# Patient Record
Sex: FEMALE | Race: WHITE
[De-identification: names, ages, dates, MRNs, and addresses within clinical notes are randomized per-mention and may not be internally consistent; named-entity substitution may affect disease eponyms.]

---

## 2024-04-05 ENCOUNTER — HOSPITAL ENCOUNTER (EMERGENCY)
Age: 19
Discharge: HOME | End: 2024-04-05
Payer: COMMERCIAL

## 2024-04-05 VITALS
DIASTOLIC BLOOD PRESSURE: 94 MMHG | OXYGEN SATURATION: 98 % | HEART RATE: 80 BPM | SYSTOLIC BLOOD PRESSURE: 138 MMHG | TEMPERATURE: 97.88 F

## 2024-04-05 VITALS — BODY MASS INDEX: 39.7 KG/M2

## 2024-04-05 DIAGNOSIS — S09.8XXA: Primary | ICD-10-CM
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
## 2024-04-05 DIAGNOSIS — Z79.899: ICD-10-CM

## 2024-04-05 DIAGNOSIS — Z97.5: ICD-10-CM

## 2024-04-05 PROCEDURE — 99282 EMERGENCY DEPT VISIT SF MDM: CPT

## 2024-08-14 ENCOUNTER — HOSPITAL ENCOUNTER (INPATIENT)
Age: 19
LOS: 1 days | Discharge: ANOTHER HOSPITAL | End: 2024-08-15
Attending: EMERGENCY MEDICINE | Admitting: PSYCHIATRY & NEUROLOGY
Payer: COMMERCIAL

## 2024-08-14 DIAGNOSIS — T39.1X2A INTENTIONAL ACETAMINOPHEN OVERDOSE, INITIAL ENCOUNTER (HCC): Primary | ICD-10-CM

## 2024-08-14 DIAGNOSIS — T50.902A INTENTIONAL OVERDOSE, INITIAL ENCOUNTER (HCC): ICD-10-CM

## 2024-08-14 DIAGNOSIS — T14.91XA SUICIDE ATTEMPT (HCC): ICD-10-CM

## 2024-08-14 LAB
ALBUMIN SERPL-MCNC: 4.3 G/DL (ref 3.5–5.2)
ALP SERPL-CCNC: 44 U/L (ref 35–104)
ALT SERPL-CCNC: 14 U/L (ref 5–33)
AMPHET UR QL SCN: NEGATIVE
ANION GAP SERPL CALCULATED.3IONS-SCNC: 12 MMOL/L (ref 9–17)
APAP SERPL-MCNC: 106 UG/ML (ref 10–30)
APAP SERPL-MCNC: 53 UG/ML (ref 10–30)
AST SERPL-CCNC: 13 U/L
BARBITURATES UR QL SCN: NEGATIVE
BASOPHILS # BLD: 0 K/UL (ref 0–0.2)
BASOPHILS NFR BLD: 1 % (ref 0–2)
BENZODIAZ UR QL: NEGATIVE
BILIRUB SERPL-MCNC: 0.3 MG/DL (ref 0.3–1.2)
BUN SERPL-MCNC: 12 MG/DL (ref 6–20)
CALCIUM SERPL-MCNC: 9 MG/DL (ref 8.6–10.4)
CANNABINOIDS UR QL SCN: POSITIVE
CHLORIDE SERPL-SCNC: 107 MMOL/L (ref 98–107)
CO2 SERPL-SCNC: 21 MMOL/L (ref 20–31)
COCAINE UR QL SCN: NEGATIVE
CREAT SERPL-MCNC: 0.7 MG/DL (ref 0.5–0.9)
EOSINOPHIL # BLD: 0.1 K/UL (ref 0–0.4)
EOSINOPHILS RELATIVE PERCENT: 1 % (ref 0–4)
ERYTHROCYTE [DISTWIDTH] IN BLOOD BY AUTOMATED COUNT: 12.4 % (ref 11.5–14.9)
ETHANOL PERCENT: <0.01 %
ETHANOLAMINE SERPL-MCNC: <10 MG/DL (ref 0–0.08)
FENTANYL UR QL: NEGATIVE
GFR, ESTIMATED: >90 ML/MIN/1.73M2
GLUCOSE SERPL-MCNC: 104 MG/DL (ref 70–99)
HCG UR QL: NEGATIVE
HCT VFR BLD AUTO: 38.9 % (ref 36–46)
HGB BLD-MCNC: 13.3 G/DL (ref 12–16)
INR PPP: 1.1
LYMPHOCYTES NFR BLD: 1.2 K/UL (ref 1.2–5.2)
LYMPHOCYTES RELATIVE PERCENT: 14 % (ref 25–45)
MAGNESIUM SERPL-MCNC: 2 MG/DL (ref 1.6–2.6)
MCH RBC QN AUTO: 30.8 PG (ref 26–34)
MCHC RBC AUTO-ENTMCNC: 34.2 G/DL (ref 31–37)
MCV RBC AUTO: 89.9 FL (ref 80–100)
METHADONE UR QL: NEGATIVE
MONOCYTES NFR BLD: 0.5 K/UL (ref 0.1–1.3)
MONOCYTES NFR BLD: 6 % (ref 2–8)
NEUTROPHILS NFR BLD: 78 % (ref 34–64)
NEUTS SEG NFR BLD: 6.9 K/UL (ref 1.3–9.1)
OPIATES UR QL SCN: NEGATIVE
OXYCODONE UR QL SCN: NEGATIVE
PARTIAL THROMBOPLASTIN TIME: 25.9 SEC (ref 24–36)
PCP UR QL SCN: NEGATIVE
PLATELET # BLD AUTO: 218 K/UL (ref 150–450)
PMV BLD AUTO: 8.7 FL (ref 6–12)
POTASSIUM SERPL-SCNC: 3.9 MMOL/L (ref 3.7–5.3)
PROT SERPL-MCNC: 7 G/DL (ref 6.4–8.3)
PROTHROMBIN TIME: 14.7 SEC (ref 11.8–14.6)
RBC # BLD AUTO: 4.33 M/UL (ref 4–5.2)
SALICYLATES SERPL-MCNC: <1 MG/DL (ref 3–10)
SODIUM SERPL-SCNC: 140 MMOL/L (ref 135–144)
TEST INFORMATION: ABNORMAL
WBC OTHER # BLD: 8.6 K/UL (ref 4.5–13.5)

## 2024-08-14 PROCEDURE — 2000000000 HC ICU R&B

## 2024-08-14 PROCEDURE — 80143 DRUG ASSAY ACETAMINOPHEN: CPT

## 2024-08-14 PROCEDURE — 93005 ELECTROCARDIOGRAM TRACING: CPT | Performed by: EMERGENCY MEDICINE

## 2024-08-14 PROCEDURE — 85730 THROMBOPLASTIN TIME PARTIAL: CPT

## 2024-08-14 PROCEDURE — 80179 DRUG ASSAY SALICYLATE: CPT

## 2024-08-14 PROCEDURE — 81025 URINE PREGNANCY TEST: CPT

## 2024-08-14 PROCEDURE — 99285 EMERGENCY DEPT VISIT HI MDM: CPT

## 2024-08-14 PROCEDURE — 6360000002 HC RX W HCPCS: Performed by: EMERGENCY MEDICINE

## 2024-08-14 PROCEDURE — 80307 DRUG TEST PRSMV CHEM ANLYZR: CPT

## 2024-08-14 PROCEDURE — 85025 COMPLETE CBC W/AUTO DIFF WBC: CPT

## 2024-08-14 PROCEDURE — 96375 TX/PRO/DX INJ NEW DRUG ADDON: CPT

## 2024-08-14 PROCEDURE — 36415 COLL VENOUS BLD VENIPUNCTURE: CPT

## 2024-08-14 PROCEDURE — G0480 DRUG TEST DEF 1-7 CLASSES: HCPCS

## 2024-08-14 PROCEDURE — 85610 PROTHROMBIN TIME: CPT

## 2024-08-14 PROCEDURE — 80053 COMPREHEN METABOLIC PANEL: CPT

## 2024-08-14 PROCEDURE — 6360000002 HC RX W HCPCS: Performed by: INTERNAL MEDICINE

## 2024-08-14 PROCEDURE — 96374 THER/PROPH/DIAG INJ IV PUSH: CPT

## 2024-08-14 PROCEDURE — 83735 ASSAY OF MAGNESIUM: CPT

## 2024-08-14 PROCEDURE — 2580000003 HC RX 258: Performed by: EMERGENCY MEDICINE

## 2024-08-14 RX ORDER — SODIUM CHLORIDE 0.9 % (FLUSH) 0.9 %
10 SYRINGE (ML) INJECTION PRN
Status: DISCONTINUED | OUTPATIENT
Start: 2024-08-14 | End: 2024-08-15 | Stop reason: HOSPADM

## 2024-08-14 RX ORDER — ENOXAPARIN SODIUM 100 MG/ML
30 INJECTION SUBCUTANEOUS 2 TIMES DAILY
Status: DISCONTINUED | OUTPATIENT
Start: 2024-08-14 | End: 2024-08-15 | Stop reason: HOSPADM

## 2024-08-14 RX ORDER — BUSPIRONE HYDROCHLORIDE 5 MG/1
5 TABLET ORAL 2 TIMES DAILY
Status: ON HOLD | COMMUNITY
Start: 2023-12-05 | End: 2024-08-18 | Stop reason: HOSPADM

## 2024-08-14 RX ORDER — ONDANSETRON 4 MG/1
4 TABLET, ORALLY DISINTEGRATING ORAL EVERY 8 HOURS PRN
Status: DISCONTINUED | OUTPATIENT
Start: 2024-08-14 | End: 2024-08-15 | Stop reason: HOSPADM

## 2024-08-14 RX ORDER — ACETAMINOPHEN 325 MG/1
650 TABLET ORAL EVERY 6 HOURS PRN
Status: DISCONTINUED | OUTPATIENT
Start: 2024-08-14 | End: 2024-08-15 | Stop reason: HOSPADM

## 2024-08-14 RX ORDER — SODIUM CHLORIDE 0.9 % (FLUSH) 0.9 %
5-40 SYRINGE (ML) INJECTION EVERY 12 HOURS SCHEDULED
Status: DISCONTINUED | OUTPATIENT
Start: 2024-08-14 | End: 2024-08-15 | Stop reason: HOSPADM

## 2024-08-14 RX ORDER — SODIUM CHLORIDE 9 MG/ML
INJECTION, SOLUTION INTRAVENOUS PRN
Status: DISCONTINUED | OUTPATIENT
Start: 2024-08-14 | End: 2024-08-15 | Stop reason: HOSPADM

## 2024-08-14 RX ORDER — NYSTATIN 10B UNIT
1 POWDER (EA) MISCELLANEOUS 2 TIMES DAILY
Status: ON HOLD | COMMUNITY
End: 2024-08-18 | Stop reason: HOSPADM

## 2024-08-14 RX ORDER — 0.9 % SODIUM CHLORIDE 0.9 %
1000 INTRAVENOUS SOLUTION INTRAVENOUS ONCE
Status: COMPLETED | OUTPATIENT
Start: 2024-08-14 | End: 2024-08-14

## 2024-08-14 RX ORDER — ACETAMINOPHEN 650 MG/1
650 SUPPOSITORY RECTAL EVERY 6 HOURS PRN
Status: DISCONTINUED | OUTPATIENT
Start: 2024-08-14 | End: 2024-08-15 | Stop reason: HOSPADM

## 2024-08-14 RX ORDER — POTASSIUM CHLORIDE 7.45 MG/ML
10 INJECTION INTRAVENOUS PRN
Status: DISCONTINUED | OUTPATIENT
Start: 2024-08-14 | End: 2024-08-15 | Stop reason: HOSPADM

## 2024-08-14 RX ORDER — MAGNESIUM SULFATE 1 G/100ML
1000 INJECTION INTRAVENOUS PRN
Status: DISCONTINUED | OUTPATIENT
Start: 2024-08-14 | End: 2024-08-15 | Stop reason: HOSPADM

## 2024-08-14 RX ORDER — LORAZEPAM 2 MG/ML
1 INJECTION INTRAMUSCULAR ONCE
Status: COMPLETED | OUTPATIENT
Start: 2024-08-14 | End: 2024-08-14

## 2024-08-14 RX ORDER — ONDANSETRON 2 MG/ML
4 INJECTION INTRAMUSCULAR; INTRAVENOUS ONCE
Status: COMPLETED | OUTPATIENT
Start: 2024-08-14 | End: 2024-08-14

## 2024-08-14 RX ORDER — POTASSIUM CHLORIDE 20 MEQ/1
40 TABLET, EXTENDED RELEASE ORAL PRN
Status: DISCONTINUED | OUTPATIENT
Start: 2024-08-14 | End: 2024-08-15 | Stop reason: HOSPADM

## 2024-08-14 RX ORDER — ARIPIPRAZOLE 5 MG/1
5 TABLET ORAL EVERY MORNING
Status: ON HOLD | COMMUNITY
Start: 2024-02-04 | End: 2024-08-18 | Stop reason: HOSPADM

## 2024-08-14 RX ORDER — ONDANSETRON 2 MG/ML
4 INJECTION INTRAMUSCULAR; INTRAVENOUS EVERY 6 HOURS PRN
Status: DISCONTINUED | OUTPATIENT
Start: 2024-08-14 | End: 2024-08-15 | Stop reason: HOSPADM

## 2024-08-14 RX ORDER — ONDANSETRON 2 MG/ML
4 INJECTION INTRAMUSCULAR; INTRAVENOUS ONCE
Status: DISCONTINUED | OUTPATIENT
Start: 2024-08-14 | End: 2024-08-15

## 2024-08-14 RX ORDER — POLYETHYLENE GLYCOL 3350 17 G/17G
17 POWDER, FOR SOLUTION ORAL DAILY PRN
Status: DISCONTINUED | OUTPATIENT
Start: 2024-08-14 | End: 2024-08-15 | Stop reason: HOSPADM

## 2024-08-14 RX ORDER — TOBRAMYCIN AND DEXAMETHASONE 3; 1 MG/ML; MG/ML
1 SUSPENSION/ DROPS OPHTHALMIC
COMMUNITY
Start: 2024-08-12

## 2024-08-14 RX ADMIN — ENOXAPARIN SODIUM 30 MG: 100 INJECTION SUBCUTANEOUS at 22:48

## 2024-08-14 RX ADMIN — ACETYLCYSTEINE 15000 MG: 200 INJECTION, SOLUTION INTRAVENOUS at 18:21

## 2024-08-14 RX ADMIN — SODIUM CHLORIDE 1000 ML: 9 INJECTION, SOLUTION INTRAVENOUS at 18:12

## 2024-08-14 RX ADMIN — ACETYLCYSTEINE 5000 MG: 200 INJECTION, SOLUTION INTRAVENOUS at 19:32

## 2024-08-14 RX ADMIN — ONDANSETRON 4 MG: 2 INJECTION INTRAMUSCULAR; INTRAVENOUS at 18:03

## 2024-08-14 RX ADMIN — ONDANSETRON 4 MG: 2 INJECTION INTRAMUSCULAR; INTRAVENOUS at 20:37

## 2024-08-14 RX ADMIN — LORAZEPAM 1 MG: 2 INJECTION INTRAMUSCULAR; INTRAVENOUS at 18:03

## 2024-08-14 ASSESSMENT — LIFESTYLE VARIABLES
HOW MANY STANDARD DRINKS CONTAINING ALCOHOL DO YOU HAVE ON A TYPICAL DAY: PATIENT DOES NOT DRINK
HOW OFTEN DO YOU HAVE A DRINK CONTAINING ALCOHOL: NEVER

## 2024-08-14 ASSESSMENT — ENCOUNTER SYMPTOMS
ABDOMINAL PAIN: 0
COUGH: 0
VOMITING: 1
NAUSEA: 1
SHORTNESS OF BREATH: 0

## 2024-08-14 NOTE — ED PROVIDER NOTES
EMERGENCY DEPARTMENT ENCOUNTER    Pt Name: Sharri Warner  MRN: [id number]  Birthdate 2005  Date of evaluation: 8/14/24  CHIEF COMPLAINT       Chief Complaint   Patient presents with    Ingestion    Suicide Attempt     HISTORY OF PRESENT ILLNESS   Presenting after an intentional overdose.  Patient said that she has been suffering with depression went through a long-term relationship breakup recently.  Patient feels like her life plans are all not amounting to anything and she felt like she did not know what to do with her life.  Patient said that she did something \"stupid \"and took about 10-15 Abilify 5 mg and 10-15 buspirone 5 mg tablets.  Patient also took an unknown amount of Tylenol.  She said that she filled one of the prescriptions with Tylenol and then tried to ingest all of it.  There is concern that she may have ingested 50+ tablets of Tylenol.  Patient said that she had some nausea and vomiting en route.  She is complaining of lightheadedness.    The history is provided by the patient.           REVIEW OF SYSTEMS     Review of Systems   Constitutional:  Negative for chills and fever.   HENT:  Negative for congestion.    Eyes:  Negative for visual disturbance.   Respiratory:  Negative for cough and shortness of breath.    Cardiovascular:  Negative for chest pain.   Gastrointestinal:  Positive for nausea and vomiting. Negative for abdominal pain.   Genitourinary:  Negative for dysuria, flank pain, frequency and urgency.   Musculoskeletal:  Negative for myalgias.   Neurological:  Positive for dizziness and light-headedness. Negative for syncope and headaches.   Psychiatric/Behavioral:  Positive for dysphoric mood and suicidal ideas.      PASTMEDICAL HISTORY   No past medical history on file.  Past Problem List  Patient Active Problem List   Diagnosis Code    Tylenol overdose, intentional self-harm, initial encounter ([facility]) T39.1X2A     SURGICAL HISTORY     No past surgical history on file.  CURRENT

## 2024-08-14 NOTE — ED TRIAGE NOTES
Mode of arrival (squad #, walk in, police, etc) : EMS        Chief complaint(s): Drug overdose - intentional/SI        Arrival Note (brief scenario, treatment PTA, etc).: Patient ingested 10-15 abilify, 10-15 buspar, and an unknown amount of Tylenol. Patient reports that this was a suicide attempt and has had several attempts in the past.         C= \"Have you ever felt that you should Cut down on your drinking?\"  No  A= \"Have people Annoyed you by criticizing your drinking?\"  No  G= \"Have you ever felt bad or Guilty about your drinking?\"  Refused  E= \"Have you ever had a drink as an Eye-opener first thing in the morning to steady your nerves or to help a hangover?\"  Refused      Deferred []      Reason for deferring: N/A    *If yes to two or more: probable alcohol abuse.*

## 2024-08-14 NOTE — PROGRESS NOTES
Pharmacy Medication History Note      List of current medications patient is taking is complete.    Source of information: [facility] ([phone]), OAS     Changes made to medication list:  Medications removed (include reason, ex. therapy complete or physician discontinued, noncompliance):  None    Medications flagged for provider review:  None    Medications added/doses adjusted:  Nystatin powder apply topically twice daily  Tobradex suspension place 1 drop into both eyes every 4 hours while awake for 5 days    Other notes (ex. Recent course of antibiotics, Coumadin dosing):  According to [facility], Tobradex was picked up on 08/12/2024  According to [facility], Abilify was last filled in April   OARRS report negative       Current Home Medication List at Time of Admission:  Prior to Admission medications    Medication Sig   busPIRone (BUSPAR) 5 MG tablet Take 1 tablet by mouth 2 times daily   ARIPiprazole (ABILIFY) 5 MG tablet Take 1 tablet by mouth every morning   tobramycin-dexAMETHasone (TOBRADEX) 0.3-0.1 % ophthalmic suspension Place 1 drop into both eyes every 4 hours (while awake) For 5 days   nystatin (MYCOSTATIN) POWD powder Apply 1 each topically 2 times daily         Please let me know if you have any questions about this encounter. Thank you!    Electronically signed by Jacqueline Vera on 8/14/2024 at 6:43 PM

## 2024-08-14 NOTE — ED NOTES
Pt verbalizes that patients mother, Romelia, can have updates on her status. Dr. Farah notified, patients mother Romelia placed in family room for update.

## 2024-08-14 NOTE — PROGRESS NOTES
Pharmacy Note    Contacted poison control regarding patients reported ingestion of ~60 Tylenol 500 mg, 10-15 Buspar 5 mg, 10-15 Abilify 5 mg at approx. 1500 today. Spoke with CHAU Mandel and discussed patient's current vitals, EKG, labs, and medications. Tequila recommended a Tylenol level at 1900. Tequila also recommends symptomatic control with antiemetics and benzodiazapines if needed for agitation. Relayed information to Dr. Farah. Tequila will call back in approx. 2 hours for an update on the patient.     Jacqueline Vera PharmD Candidate 2025      I personally witnessed discussions with Poison Control and Dr. Farah. This is an accurate reflection of the conversations.     Thank you,  Juliane Gaxiola, PharmD, BCPS  [phone]

## 2024-08-15 ENCOUNTER — HOSPITAL ENCOUNTER (INPATIENT)
Age: 19
LOS: 3 days | Discharge: HOME | DRG: 881 | End: 2024-08-18
Attending: PSYCHIATRY & NEUROLOGY | Admitting: PSYCHIATRY & NEUROLOGY
Payer: COMMERCIAL

## 2024-08-15 VITALS
SYSTOLIC BLOOD PRESSURE: 130 MMHG | HEIGHT: 67 IN | HEART RATE: 76 BPM | DIASTOLIC BLOOD PRESSURE: 81 MMHG | BODY MASS INDEX: 39.24 KG/M2 | TEMPERATURE: 97.8 F | WEIGHT: 250 LBS | RESPIRATION RATE: 18 BRPM | OXYGEN SATURATION: 98 %

## 2024-08-15 PROBLEM — R45.851 DEPRESSION WITH SUICIDAL IDEATION: Status: ACTIVE | Noted: 2024-08-15

## 2024-08-15 PROBLEM — F32.A DEPRESSION WITH SUICIDAL IDEATION: Status: ACTIVE | Noted: 2024-08-15

## 2024-08-15 LAB
ALBUMIN SERPL-MCNC: 4.1 G/DL (ref 3.5–5.2)
ALP SERPL-CCNC: 39 U/L (ref 35–104)
ALT SERPL-CCNC: 13 U/L (ref 5–33)
ANION GAP SERPL CALCULATED.3IONS-SCNC: 11 MMOL/L (ref 9–17)
APAP SERPL-MCNC: <5 UG/ML (ref 10–30)
AST SERPL-CCNC: 11 U/L
BASOPHILS # BLD: 0.1 K/UL (ref 0–0.2)
BASOPHILS NFR BLD: 1 % (ref 0–2)
BILIRUB DIRECT SERPL-MCNC: 0.1 MG/DL
BILIRUB INDIRECT SERPL-MCNC: 0.5 MG/DL (ref 0–1)
BILIRUB SERPL-MCNC: 0.6 MG/DL (ref 0.3–1.2)
BUN SERPL-MCNC: 9 MG/DL (ref 6–20)
CALCIUM SERPL-MCNC: 8.3 MG/DL (ref 8.6–10.4)
CHLORIDE SERPL-SCNC: 111 MMOL/L (ref 98–107)
CO2 SERPL-SCNC: 19 MMOL/L (ref 20–31)
CREAT SERPL-MCNC: 0.6 MG/DL (ref 0.5–0.9)
EKG ATRIAL RATE: 56 BPM
EKG P AXIS: 43 DEGREES
EKG P-R INTERVAL: 154 MS
EKG Q-T INTERVAL: 430 MS
EKG QRS DURATION: 76 MS
EKG QTC CALCULATION (BAZETT): 414 MS
EKG R AXIS: 62 DEGREES
EKG T AXIS: 37 DEGREES
EKG VENTRICULAR RATE: 56 BPM
EOSINOPHIL # BLD: 0.1 K/UL (ref 0–0.4)
EOSINOPHILS RELATIVE PERCENT: 1 % (ref 0–4)
ERYTHROCYTE [DISTWIDTH] IN BLOOD BY AUTOMATED COUNT: 12.7 % (ref 11.5–14.9)
GFR, ESTIMATED: >90 ML/MIN/1.73M2
GLUCOSE SERPL-MCNC: 94 MG/DL (ref 70–99)
HCT VFR BLD AUTO: 39.2 % (ref 36–46)
HGB BLD-MCNC: 13.4 G/DL (ref 12–16)
LACTATE BLDV-SCNC: 1 MMOL/L (ref 0.5–2.2)
LYMPHOCYTES NFR BLD: 1.7 K/UL (ref 1.2–5.2)
LYMPHOCYTES RELATIVE PERCENT: 22 % (ref 25–45)
MCH RBC QN AUTO: 30.7 PG (ref 26–34)
MCHC RBC AUTO-ENTMCNC: 34.1 G/DL (ref 31–37)
MCV RBC AUTO: 90 FL (ref 80–100)
MONOCYTES NFR BLD: 0.5 K/UL (ref 0.1–1.3)
MONOCYTES NFR BLD: 6 % (ref 2–8)
NEUTROPHILS NFR BLD: 70 % (ref 34–64)
NEUTS SEG NFR BLD: 5.7 K/UL (ref 1.3–9.1)
PLATELET # BLD AUTO: 217 K/UL (ref 150–450)
PMV BLD AUTO: 8.4 FL (ref 6–12)
POTASSIUM SERPL-SCNC: 4.1 MMOL/L (ref 3.7–5.3)
PROT SERPL-MCNC: 6.4 G/DL (ref 6.4–8.3)
RBC # BLD AUTO: 4.35 M/UL (ref 4–5.2)
SODIUM SERPL-SCNC: 141 MMOL/L (ref 135–144)
TSH SERPL DL<=0.05 MIU/L-ACNC: 2.03 UIU/ML (ref 0.3–5)
WBC OTHER # BLD: 8.1 K/UL (ref 4.5–13.5)

## 2024-08-15 PROCEDURE — 2580000003 HC RX 258: Performed by: INTERNAL MEDICINE

## 2024-08-15 PROCEDURE — 80048 BASIC METABOLIC PNL TOTAL CA: CPT

## 2024-08-15 PROCEDURE — 94761 N-INVAS EAR/PLS OXIMETRY MLT: CPT

## 2024-08-15 PROCEDURE — 6370000000 HC RX 637 (ALT 250 FOR IP): Performed by: PSYCHIATRY & NEUROLOGY

## 2024-08-15 PROCEDURE — 84443 ASSAY THYROID STIM HORMONE: CPT

## 2024-08-15 PROCEDURE — 1240000000 HC EMOTIONAL WELLNESS R&B

## 2024-08-15 PROCEDURE — HZ56ZZZ INDIVIDUAL PSYCHOTHERAPY FOR SUBSTANCE ABUSE TREATMENT, PSYCHOEDUCATION: ICD-10-PCS | Performed by: PSYCHIATRY & NEUROLOGY

## 2024-08-15 PROCEDURE — 80076 HEPATIC FUNCTION PANEL: CPT

## 2024-08-15 PROCEDURE — 85025 COMPLETE CBC W/AUTO DIFF WBC: CPT

## 2024-08-15 PROCEDURE — 36415 COLL VENOUS BLD VENIPUNCTURE: CPT

## 2024-08-15 PROCEDURE — 80143 DRUG ASSAY ACETAMINOPHEN: CPT

## 2024-08-15 PROCEDURE — 83605 ASSAY OF LACTIC ACID: CPT

## 2024-08-15 PROCEDURE — 99222 1ST HOSP IP/OBS MODERATE 55: CPT | Performed by: PSYCHIATRY & NEUROLOGY

## 2024-08-15 PROCEDURE — 99223 1ST HOSP IP/OBS HIGH 75: CPT | Performed by: INTERNAL MEDICINE

## 2024-08-15 PROCEDURE — 6370000000 HC RX 637 (ALT 250 FOR IP): Performed by: INTERNAL MEDICINE

## 2024-08-15 RX ORDER — SODIUM CHLORIDE 0.9 % (FLUSH) 0.9 %
10 SYRINGE (ML) INJECTION PRN
OUTPATIENT
Start: 2024-08-15

## 2024-08-15 RX ORDER — TOBRAMYCIN AND DEXAMETHASONE 3; 1 MG/ML; MG/ML
1 SUSPENSION/ DROPS OPHTHALMIC
Status: DISCONTINUED | OUTPATIENT
Start: 2024-08-15 | End: 2024-08-18 | Stop reason: HOSPADM

## 2024-08-15 RX ORDER — LORAZEPAM 1 MG/1
2 TABLET ORAL EVERY 6 HOURS PRN
Status: DISCONTINUED | OUTPATIENT
Start: 2024-08-15 | End: 2024-08-18 | Stop reason: HOSPADM

## 2024-08-15 RX ORDER — POLYETHYLENE GLYCOL 3350 17 G/17G
17 POWDER, FOR SOLUTION ORAL DAILY PRN
Status: DISCONTINUED | OUTPATIENT
Start: 2024-08-15 | End: 2024-08-18 | Stop reason: HOSPADM

## 2024-08-15 RX ORDER — POLYETHYLENE GLYCOL 3350 17 G/17G
17 POWDER, FOR SOLUTION ORAL DAILY PRN
OUTPATIENT
Start: 2024-08-15

## 2024-08-15 RX ORDER — DIPHENHYDRAMINE HYDROCHLORIDE 50 MG/ML
50 INJECTION INTRAMUSCULAR; INTRAVENOUS EVERY 6 HOURS PRN
Status: DISCONTINUED | OUTPATIENT
Start: 2024-08-15 | End: 2024-08-18 | Stop reason: HOSPADM

## 2024-08-15 RX ORDER — HALOPERIDOL 5 MG/1
5 TABLET ORAL EVERY 6 HOURS PRN
Status: DISCONTINUED | OUTPATIENT
Start: 2024-08-15 | End: 2024-08-18 | Stop reason: HOSPADM

## 2024-08-15 RX ORDER — IBUPROFEN 400 MG/1
400 TABLET ORAL EVERY 6 HOURS PRN
Status: DISCONTINUED | OUTPATIENT
Start: 2024-08-15 | End: 2024-08-18 | Stop reason: HOSPADM

## 2024-08-15 RX ORDER — MAGNESIUM HYDROXIDE/ALUMINUM HYDROXICE/SIMETHICONE 120; 1200; 1200 MG/30ML; MG/30ML; MG/30ML
30 SUSPENSION ORAL EVERY 6 HOURS PRN
Status: DISCONTINUED | OUTPATIENT
Start: 2024-08-15 | End: 2024-08-18 | Stop reason: HOSPADM

## 2024-08-15 RX ORDER — HYDROXYZINE 50 MG/1
50 TABLET, FILM COATED ORAL 3 TIMES DAILY PRN
Status: DISCONTINUED | OUTPATIENT
Start: 2024-08-15 | End: 2024-08-18 | Stop reason: HOSPADM

## 2024-08-15 RX ORDER — TRAZODONE HYDROCHLORIDE 50 MG/1
50 TABLET ORAL NIGHTLY PRN
Status: DISCONTINUED | OUTPATIENT
Start: 2024-08-15 | End: 2024-08-18 | Stop reason: HOSPADM

## 2024-08-15 RX ORDER — ACETAMINOPHEN 325 MG/1
650 TABLET ORAL EVERY 6 HOURS PRN
OUTPATIENT
Start: 2024-08-15

## 2024-08-15 RX ORDER — LORAZEPAM 2 MG/ML
2 INJECTION INTRAMUSCULAR EVERY 6 HOURS PRN
Status: DISCONTINUED | OUTPATIENT
Start: 2024-08-15 | End: 2024-08-18 | Stop reason: HOSPADM

## 2024-08-15 RX ORDER — HALOPERIDOL 5 MG/ML
5 INJECTION INTRAMUSCULAR EVERY 6 HOURS PRN
Status: DISCONTINUED | OUTPATIENT
Start: 2024-08-15 | End: 2024-08-18 | Stop reason: HOSPADM

## 2024-08-15 RX ORDER — SODIUM CHLORIDE 0.9 % (FLUSH) 0.9 %
5-40 SYRINGE (ML) INJECTION EVERY 12 HOURS SCHEDULED
OUTPATIENT
Start: 2024-08-15

## 2024-08-15 RX ORDER — MAGNESIUM SULFATE 1 G/100ML
1000 INJECTION INTRAVENOUS PRN
OUTPATIENT
Start: 2024-08-15

## 2024-08-15 RX ORDER — ACETAMINOPHEN 650 MG/1
650 SUPPOSITORY RECTAL EVERY 6 HOURS PRN
OUTPATIENT
Start: 2024-08-15

## 2024-08-15 RX ORDER — TOBRAMYCIN AND DEXAMETHASONE 3; 1 MG/ML; MG/ML
1 SUSPENSION/ DROPS OPHTHALMIC
Status: DISCONTINUED | OUTPATIENT
Start: 2024-08-15 | End: 2024-08-15 | Stop reason: HOSPADM

## 2024-08-15 RX ORDER — POLYETHYLENE GLYCOL 3350 17 G
2 POWDER IN PACKET (EA) ORAL
Status: DISCONTINUED | OUTPATIENT
Start: 2024-08-15 | End: 2024-08-18 | Stop reason: HOSPADM

## 2024-08-15 RX ORDER — ACETAMINOPHEN 325 MG/1
650 TABLET ORAL EVERY 6 HOURS PRN
Status: DISCONTINUED | OUTPATIENT
Start: 2024-08-15 | End: 2024-08-15

## 2024-08-15 RX ORDER — TOBRAMYCIN AND DEXAMETHASONE 3; 1 MG/ML; MG/ML
1 SUSPENSION/ DROPS OPHTHALMIC
Status: CANCELLED | OUTPATIENT
Start: 2024-08-15 | End: 2024-08-20

## 2024-08-15 RX ADMIN — IBUPROFEN 400 MG: 400 TABLET, FILM COATED ORAL at 20:07

## 2024-08-15 RX ADMIN — Medication 10 ML: at 09:22

## 2024-08-15 RX ADMIN — TOBRAMYCIN AND DEXAMETHASONE 1 DROP: 3; 1 SUSPENSION/ DROPS OPHTHALMIC at 12:23

## 2024-08-15 RX ADMIN — NICOTINE POLACRILEX 2 MG: 2 LOZENGE ORAL at 18:49

## 2024-08-15 RX ADMIN — TOBRAMYCIN AND DEXAMETHASONE 1 DROP: 1; 3 SUSPENSION/ DROPS OPHTHALMIC at 20:28

## 2024-08-15 RX ADMIN — TOBRAMYCIN AND DEXAMETHASONE 1 DROP: 1; 3 SUSPENSION/ DROPS OPHTHALMIC at 17:29

## 2024-08-15 ASSESSMENT — PATIENT HEALTH QUESTIONNAIRE - PHQ9
1. LITTLE INTEREST OR PLEASURE IN DOING THINGS: SEVERAL DAYS
2. FEELING DOWN, DEPRESSED OR HOPELESS: SEVERAL DAYS
SUM OF ALL RESPONSES TO PHQ QUESTIONS 1-9: 2
SUM OF ALL RESPONSES TO PHQ9 QUESTIONS 1 & 2: 2
SUM OF ALL RESPONSES TO PHQ QUESTIONS 1-9: 2

## 2024-08-15 ASSESSMENT — SLEEP AND FATIGUE QUESTIONNAIRES
SLEEP PATTERN: DIFFICULTY FALLING ASLEEP;DISTURBED/INTERRUPTED SLEEP
AVERAGE NUMBER OF SLEEP HOURS: 7
DO YOU USE A SLEEP AID: NO
DO YOU HAVE DIFFICULTY SLEEPING: YES

## 2024-08-15 ASSESSMENT — PAIN SCALES - GENERAL
PAINLEVEL_OUTOF10: 3
PAINLEVEL_OUTOF10: 0

## 2024-08-15 ASSESSMENT — PAIN DESCRIPTION - LOCATION: LOCATION: ABDOMEN

## 2024-08-15 ASSESSMENT — PAIN DESCRIPTION - DESCRIPTORS: DESCRIPTORS: ACHING;CRAMPING

## 2024-08-15 NOTE — PROGRESS NOTES
Writer messaged RAUL Young to let her know of poison controls recommendations. RAUL agrees with recommendations and states okay to d/c acetylcysteine and serial acetaminophen levels.

## 2024-08-15 NOTE — CARE COORDINATION
Case Management Assessment  Initial Evaluation    Date/Time of Evaluation: 8/15/2024 2:04 PM  Assessment Completed by: Carola Hammonds RN    If patient is discharged prior to next notation, then this note serves as note for discharge by case management.    Patient Name: Sharri Warner                   YOB: 2005  Diagnosis: Suicide attempt (HCC) [T14.91XA]  Intentional acetaminophen overdose, initial encounter (HCC) [T39.1X2A]  Tylenol overdose, intentional self-harm, initial encounter (HCC) [T39.1X2A]  Intentional overdose, initial encounter (HCC) [T50.902A]                   Date / Time: 8/14/2024  4:46 PM    Patient Admission Status: Inpatient   Readmission Risk (Low < 19, Mod (19-27), High > 27): Readmission Risk Score: 4.2    Current PCP: No primary care provider on file.  PCP verified by CM? Yes    Chart Reviewed: Yes      History Provided by: Patient  Patient Orientation: Alert and Oriented    Patient Cognition: Alert    Hospitalization in the last 30 days (Readmission):  No    If yes, Readmission Assessment in CM Navigator will be completed.    Advance Directives:      Code Status: Full Code   Patient's Primary Decision Maker is: Legal Next of Kin      Discharge Planning:    Patient lives with: Parent Type of Home: House  Primary Care Giver: Self  Patient Support Systems include: Parent, Family Members   Current Financial resources: None  Current community resources: None  Current services prior to admission: Other (Comment) (linked with mental health counselor at [facility])            Current DME:              Type of Home Care services:  None    ADLS  Prior functional level: Independent in ADLs/IADLs  Current functional level: Independent in ADLs/IADLs    PT AM-PAC:   /24  OT AM-PAC:   /24    Family can provide assistance at DC: Yes  Would you like Case Management to discuss the discharge plan with any other family members/significant others, and if so, who? No  Plans to

## 2024-08-15 NOTE — ED NOTES
Report given to , RN from ICU.   Report method in person   The following was reviewed with receiving RN:   Current vital signs:  /86   Pulse 81   Temp 98.3 °F (36.8 °C) (Oral)   Resp 24   Ht 1.702 m (5' 7\")   Wt 113.4 kg (250 lb)   SpO2 98%   BMI 39.16 kg/m²                MEWS Score: 2     Any medication or safety alerts were reviewed. Any pending diagnostics and notifications were also reviewed, as well as any safety concerns or issues, abnormal labs, abnormal imaging, and abnormal assessment findings. Questions were answered.

## 2024-08-15 NOTE — PROGRESS NOTES
Psych resident rounded at bedside. Updates given. Plan of care discussed with patient and patients mother.

## 2024-08-15 NOTE — PROGRESS NOTES
Occupational Therapy  DATE: 8/15/2024    NAME: Sharri Warner  MRN: [id number]   : 2005    Patient not seen this date for Occupational Therapy due to:      [] Cancel by RN or physician due to:    [] Hemodialysis    [] Critical Lab Value Level     [] Blood transfusion in progress    [] Acute or unstable cardiovascular status   _MAP < 55 or more than >115  _HR < 40 or > 130    [] Acute or unstable pulmonary status   -FiO2 > 60%   _RR < 5 or >40    _O2 sats < 85%    [] Strict Bedrest    [] Off Unit for surgery or procedure    [] Off Unit for testing       [] Pending imaging to R/O fracture    [] Refusal by Patient      [] Intubated    [] Other      [x] OT being discontinued at this time. Patient independent. No further needs.  Pt indep in room with no current needs reported or observed. OT to discontinue orders.      [] OT being discontinued at this time as the patient has been transferred to hospice care. No further needs.      Khalida Abbott, ORTEGA, OTR/L

## 2024-08-15 NOTE — PLAN OF CARE
Problem: Discharge Planning  Goal: Discharge to home or other facility with appropriate resources  8/15/2024 1110 by Lisette Dumont, RN  Outcome: Progressing  Flowsheets (Taken 8/15/2024 0400 by Emili Lynch, RN)  Discharge to home or other facility with appropriate resources:   Identify barriers to discharge with patient and caregiver   Arrange for needed discharge resources and transportation as appropriate   Identify discharge learning needs (meds, wound care, etc)   Refer to discharge planning if patient needs post-hospital services based on physician order or complex needs related to functional status, cognitive ability or social support system  8/15/2024 0248 by Emili Lynch, RN  Outcome: Progressing  Flowsheets  Taken 8/15/2024 0000  Discharge to home or other facility with appropriate resources:   Identify barriers to discharge with patient and caregiver   Identify discharge learning needs (meds, wound care, etc)   Arrange for needed discharge resources and transportation as appropriate   Refer to discharge planning if patient needs post-hospital services based on physician order or complex needs related to functional status, cognitive ability or social support system  Taken 8/14/2024 2110  Discharge to home or other facility with appropriate resources:   Identify barriers to discharge with patient and caregiver   Arrange for needed discharge resources and transportation as appropriate   Identify discharge learning needs (meds, wound care, etc)   Refer to discharge planning if patient needs post-hospital services based on physician order or complex needs related to functional status, cognitive ability or social support system     Problem: Neurosensory - Adult  Goal: No signs of physiological stress  Description: No signs of physiological stress  Outcome: Progressing

## 2024-08-15 NOTE — PROGRESS NOTES
BHI at bedside. Consent form signed. PIVs removed. Patient's mother at bedside, updates given and belongings given to patient's mother.

## 2024-08-15 NOTE — H&P
[facility]   IN-PATIENT SERVICE   [facility]    HISTORY AND PHYSICAL EXAMINATION            Date:   8/15/2024  Patient name:  Sharri Warner  Date of admission:  8/14/2024  4:46 PM  MRN:   [id number]  Account:  [id number]  YOB: 2005  PCP:    No primary care provider on file.  Room:   2002/2002-01  Code Status:    Full Code    Chief Complaint:     Chief Complaint   Patient presents with    Ingestion    Suicide Attempt       History Obtained From:     patient    History of Present Illness:     The patient is a 18 y.o.  Non- / non  female who presents with Ingestion and Suicide Attempt   and she is admitted to the hospital for the management of    Patient is 18-year-old female with past medical history of anxiety and depression presented to the ER with drug overdose, patient overdosed on Tylenol Abilify BuSpar, patient not known she took, initially concern was the patient took 50+ tablets of Tylenol 500 mg, her initial Tylenol level was 53 which was not not the peak since before hours when not passed, repeat Tylenol level was 106  Patient had nausea last night which is now resolved, LFTs been  Past Medical History:     No past medical history on file.     Past Surgical History:     No past surgical history on file.     Medications Prior to Admission:     Prior to Admission medications    Medication Sig Start Date End Date Taking? Authorizing Provider   busPIRone (BUSPAR) 5 MG tablet Take 1 tablet by mouth 2 times daily 12/5/23 12/4/24 Yes Mary Jane Tinoco MD   ARIPiprazole (ABILIFY) 5 MG tablet Take 1 tablet by mouth every morning 2/4/24  Yes Mary Jane Tinoco MD   tobramycin-dexAMETHasone (TOBRADEX) 0.3-0.1 % ophthalmic suspension Place 1 drop into both eyes every 4 hours (while awake) For 5 days 8/12/24   Mary Jane Tinoco MD   nystatin (MYCOSTATIN) POWD powder Apply 1 each topically 2 times daily    Mary Jane Tinoco MD        Allergies:  08/14/24  5:17 PM   Result Value Ref Range    Ventricular Rate 56 BPM    Atrial Rate 56 BPM    P-R Interval 154 ms    QRS Duration 76 ms    Q-T Interval 430 ms    QTc Calculation (Bazett) 414 ms    P Axis 43 degrees    R Axis 62 degrees    T Axis 37 degrees   Acetaminophen Level    Collection Time: 08/14/24  7:22 PM   Result Value Ref Range    Acetaminophen Level 106 (H) 10 - 30 ug/mL   CBC with Auto Differential    Collection Time: 08/15/24  4:27 AM   Result Value Ref Range    WBC 8.1 4.5 - 13.5 k/uL    RBC 4.35 4.0 - 5.2 m/uL    Hemoglobin 13.4 12.0 - 16.0 g/dL    Hematocrit 39.2 36 - 46 %    MCV 90.0 80 - 100 fL    MCH 30.7 26 - 34 pg    MCHC 34.1 31 - 37 g/dL    RDW 12.7 11.5 - 14.9 %    Platelets 217 150 - 450 k/uL    MPV 8.4 6.0 - 12.0 fL    Neutrophils % 70 (H) 34 - 64 %    Lymphocytes % 22 (L) 25 - 45 %    Monocytes % 6 2 - 8 %    Eosinophils % 1 0 - 4 %    Basophils % 1 0 - 2 %    Neutrophils Absolute 5.70 1.3 - 9.1 k/uL    Lymphocytes Absolute 1.70 1.2 - 5.2 k/uL    Monocytes Absolute 0.50 0.1 - 1.3 k/uL    Eosinophils Absolute 0.10 0.0 - 0.4 k/uL    Basophils Absolute 0.10 0.0 - 0.2 k/uL   Basic Metabolic Panel    Collection Time: 08/15/24  4:27 AM   Result Value Ref Range    Sodium 141 135 - 144 mmol/L    Potassium 4.1 3.7 - 5.3 mmol/L    Chloride 111 (H) 98 - 107 mmol/L    CO2 19 (L) 20 - 31 mmol/L    Anion Gap 11 9 - 17 mmol/L    Glucose 94 70 - 99 mg/dL    BUN 9 6 - 20 mg/dL    Creatinine 0.6 0.5 - 0.9 mg/dL    Est, Glom Filt Rate >90 >60 mL/min/1.73m2    Calcium 8.3 (L) 8.6 - 10.4 mg/dL       Imaging/Diagnostics:    No image results found.       Assessment :      Primary Problem  Tylenol overdose, intentional self-harm, initial encounter ([facility])    Active Hospital Problems    Diagnosis Date Noted    Tylenol overdose, intentional self-harm, initial encounter ([facility]) [T39.1X2A] 08/14/2024       Plan:     Patient status Admit as inpatient in the  Progressive Unit/Step down    Intentional drug overdose,

## 2024-08-15 NOTE — PROGRESS NOTES
Writer spoke with Iman in [facility]. Case presented to RN.  [facility] staff to bring voluntary consent form to patient.

## 2024-08-15 NOTE — PROGRESS NOTES
Dr Rome at bedside requesting another acetaminophen level and writer to contact poison control to verify that they would not like acetylcysteine given. Partial dose given and stopped at 2250 last night (8/14). If patient is ok without acetylcysteine, patient is ok to transfer to [facility] per Dr Rome once ok with critical care and psych.

## 2024-08-15 NOTE — PROGRESS NOTES
Patient's mother at bedside and states patient is supposed to be taking eyedrops for pink eye. Patient's mother brought eyedrops to bedside. Dr Rome notified. Eyedrops ordered. See Orders

## 2024-08-15 NOTE — PROGRESS NOTES
Dr. Posadas rounded at bedside. Updates given. Okay to transfer to [facility]

## 2024-08-15 NOTE — GROUP NOTE
Patient not on unit when group was in session.

## 2024-08-15 NOTE — CONSULTS
Department of Psychiatry  Behavioral Health Consult    REASON FOR CONSULT: Suicide Attempt, requested by Ruddy Chowdhury MD     CONSULTING PHYSICIAN: Dr Vipin CAMEJO    History obtained from: Pt and EMR    HISTORY OF PRESENT ILLNESS:    The patient is a 18 y.o. female with significant past psychiatric history of depression and anxiety who presents with suicide attempt.    Admission Reasoning: Pt was brought into ED via EMS after intentional overdose. Pt indicates suffering with depression and going through a long term relationship breakup recently. Pt said she \"did something stupid\"  and took about 10-15 Abilify 5 mg and 10-15 buspirone 5 mg tablets. PT also took an unknown amount of Tylenol. There was concern that it could have been 50+ Tylenol. Pt indicated nausea and vomiting en route. Pt complained of lightheadedness. According to chart, pt had a Tylenol level of 53 and was started on N-acetylcysteine. Pt was later taken off and serial level checks were discontinued per poison control recommendations. Pt is agreeable to admission to [facility] and is currently with sitter.     Past Psych Hx: Pt indicated history of depression and anxiety. Pt indicated that she has had this her whole life. Pt indicates that she may have borderline personality disorder but it was tested under the age of 16 and no one would make an official diagnosis. Pt indicated that she was compliant with her meds before this but that she stopped them a few weeks before. Pt also admitted to stopping therapy because pt felt like everything was fine. Pt indicated that she sees her general practitioner for med refills. Pt indicated that Abilify 5mg and buspirone 5mg were medications prescribed to her. She believes these meds to have been very helpful to her. Pt indicated two previous hospitalizations for suicide attempts. With the last one occurring 3-4 years ago.     PMHx: Indicated no pertinent past medical history and no current conditions or illnesses.  conducted.  Supportive Therapy conducted.  Follow-up daily while on inpatient unit    Electronically signed by MASOUD FORTUNE MD on 8/15/24 at 2:45 PM EDT

## 2024-08-15 NOTE — PLAN OF CARE
Problem: Discharge Planning  Goal: Discharge to home or other facility with appropriate resources  Outcome: Progressing  Flowsheets  Taken 8/15/2024 0000  Discharge to home or other facility with appropriate resources:   Identify barriers to discharge with patient and caregiver   Identify discharge learning needs (meds, wound care, etc)   Arrange for needed discharge resources and transportation as appropriate   Refer to discharge planning if patient needs post-hospital services based on physician order or complex needs related to functional status, cognitive ability or social support system  Taken 8/14/2024 2110  Discharge to home or other facility with appropriate resources:   Identify barriers to discharge with patient and caregiver   Arrange for needed discharge resources and transportation as appropriate   Identify discharge learning needs (meds, wound care, etc)   Refer to discharge planning if patient needs post-hospital services based on physician order or complex needs related to functional status, cognitive ability or social support system

## 2024-08-15 NOTE — PROGRESS NOTES
Labs reviewed lactic acid normal LFT normal acetaminophen level less than 5, patient medically cleared to be discharged to

## 2024-08-15 NOTE — PROGRESS NOTES
Poison control called unit and writer provided update. Poison control states that pt does not need to be on the acetylcysteine gtt or have serial acetaminophen level drawn because pt does not have a acetaminophen level at which they would treat.

## 2024-08-15 NOTE — PROGRESS NOTES
Pt admitted to ICU 2002. Cardiac monitor applied and vital signs obtained. Pt oriented to room and belongings in closet. Call light within reach and bed on lowest postion.

## 2024-08-15 NOTE — CONSULTS
[facility]  Joss Fortune MD/Zach AGUIRRE AGACNP-BC, NP-C      Sofia AGUIRRE NP-C      Je AGUIRRE NP-C     Pulmonary and Critical Care CONSULT NOTE:      DATE OF CONSULT 8/15/2024    REASON FOR CONSULTATION:  Suicide attempt with polysubstance overdose      PCP No primary care provider on file.     CHIEF COMPLAINT: Suicide attempt    HISTORY OF PRESENT ILLNESS:   This is a 18-year-old female with longstanding history of depression.  She has prior history of suicidal attempt and was admitted to inpatient psychiatry previously in the [place] area.  She lives down near [facility].  She was here in a parking lot on the phone with someone when she had severe depression and intentionally took overdose of multiple medications.  After 1 hour she presented to the ER realizing she had made a mistake.  She was seeking help.  I was told the ingestion was multiple hours prior to presentation but in fact she presented within 1 hour of ingestion.  She did not receive charcoal.  She was started on N-acetylcysteine due to Tylenol level of 106 but based on the nomogram after 1 hour of ingestion she would be below the toxic range and her repeat acetaminophen level is negative this morning.    She was apparently lethargic in the ER and may be even twitching but she is alert cooperative and oriented today.  EKG shows no QT prolongation or QRS widening.    She has no cardiac or pulmonary history.  She denies any self-inflicted injuries.  Her parents are aware and are bringing her up close later today.  She is aware that she will need to go to inpatient psychiatry.    She denies nausea or vomiting but was nauseous yesterday.  This is improved now.      ALLERGIES:  Allergies   Allergen Reactions    Penicillins        HOME MEDICATIONS:  Medications Prior to Admission: busPIRone (BUSPAR) 5 MG tablet, Take 1 tablet by mouth 2 times

## 2024-08-16 PROCEDURE — 99222 1ST HOSP IP/OBS MODERATE 55: CPT | Performed by: INTERNAL MEDICINE

## 2024-08-16 PROCEDURE — 99223 1ST HOSP IP/OBS HIGH 75: CPT | Performed by: PSYCHIATRY & NEUROLOGY

## 2024-08-16 PROCEDURE — 1240000000 HC EMOTIONAL WELLNESS R&B

## 2024-08-16 PROCEDURE — 6370000000 HC RX 637 (ALT 250 FOR IP): Performed by: PSYCHIATRY & NEUROLOGY

## 2024-08-16 RX ORDER — SERTRALINE HYDROCHLORIDE 25 MG/1
25 TABLET, FILM COATED ORAL DAILY
Status: DISCONTINUED | OUTPATIENT
Start: 2024-08-16 | End: 2024-08-16

## 2024-08-16 RX ORDER — ARIPIPRAZOLE 15 MG/1
15 TABLET ORAL DAILY
Status: DISCONTINUED | OUTPATIENT
Start: 2024-08-16 | End: 2024-08-17

## 2024-08-16 RX ADMIN — TOBRAMYCIN AND DEXAMETHASONE 1 DROP: 1; 3 SUSPENSION/ DROPS OPHTHALMIC at 10:01

## 2024-08-16 RX ADMIN — ARIPIPRAZOLE 15 MG: 15 TABLET ORAL at 18:34

## 2024-08-16 RX ADMIN — HYDROXYZINE HYDROCHLORIDE 50 MG: 50 TABLET, FILM COATED ORAL at 21:51

## 2024-08-16 RX ADMIN — TOBRAMYCIN AND DEXAMETHASONE 1 DROP: 1; 3 SUSPENSION/ DROPS OPHTHALMIC at 21:51

## 2024-08-16 RX ADMIN — TRAZODONE HYDROCHLORIDE 50 MG: 50 TABLET ORAL at 21:51

## 2024-08-16 RX ADMIN — TOBRAMYCIN AND DEXAMETHASONE 1 DROP: 1; 3 SUSPENSION/ DROPS OPHTHALMIC at 13:28

## 2024-08-16 RX ADMIN — TOBRAMYCIN AND DEXAMETHASONE 1 DROP: 1; 3 SUSPENSION/ DROPS OPHTHALMIC at 18:00

## 2024-08-16 RX ADMIN — NICOTINE POLACRILEX 2 MG: 2 LOZENGE ORAL at 20:19

## 2024-08-16 RX ADMIN — TOBRAMYCIN AND DEXAMETHASONE 1 DROP: 1; 3 SUSPENSION/ DROPS OPHTHALMIC at 06:07

## 2024-08-16 ASSESSMENT — LIFESTYLE VARIABLES
HOW OFTEN DO YOU HAVE A DRINK CONTAINING ALCOHOL: MONTHLY OR LESS
HOW MANY STANDARD DRINKS CONTAINING ALCOHOL DO YOU HAVE ON A TYPICAL DAY: 1 OR 2

## 2024-08-16 NOTE — PLAN OF CARE
Problem: Self Harm/Suicidality  Goal: Will have no self-injury during hospital stay  Description: INTERVENTIONS:  1.  Ensure constant observer at bedside with Q15M safety checks  2.  Maintain a safe environment  3.  Secure patient belongings  4.  Ensure family/visitors adhere to safety recommendations  5.  Ensure safety tray has been added to patient's diet order  6.  Every shift and PRN: Re-assess suicidal risk via Frequent Screener    8/16/2024 1011 by Shanta Babcock RN  Outcome: Progressing     Problem: Depression  Goal: Will be euthymic at discharge  Description: INTERVENTIONS:  1. Administer medication as ordered  2. Provide emotional support via 1:1 interaction with staff  3. Encourage involvement in milieu/groups/activities  4. Monitor for social isolation  8/16/2024 1011 by Shanta Babcock, RN  Outcome: Progressing     Problem: Anxiety  Goal: Will report anxiety at manageable levels  Description: INTERVENTIONS:  1. Administer medication as ordered  2. Teach and rehearse alternative coping skills  3. Provide emotional support with 1:1 interaction with staff  Outcome: Progressing    Patient denies suicidal thoughts, thoughts to self harm, and has remained free from self injury thus far.   During 1:1 conversations, patient observed as tearful and anxious.  Patient reports that this is related to not feeling as \"in control\" here as they normally do, and that they are working on letting some of that control go.   Patient states that they are working on utilizing deep breathing and writing as coping mechanisms.  Patient verbalizes understanding of both pharmaceutical and non-pharmaceutical methods of anxiety management.  Patient observed as selectively social in the milieu and an active participant in unit activities.   Patient encouraged to seek staff for any needs or concerns that may arise.  Safe environment maintained.  Safety checks in place q15 min per protocol.
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
## 2024-08-16 NOTE — PLAN OF CARE
[facility]  Initial Interdisciplinary Treatment Plan Note      Original treatment plan Date & Time: 8/16/2024   1245    Admission Type:  Admission Type: Voluntary    Reason for admission:   Reason for Admission: Suicide attempt by overdosing on abilify, buspar, and tylenol related to recent break up with long term relationship and stress from work.    Estimated Length of Stay:  5-7days  Estimated Discharge Date: To be determined by physician.    PATIENT STRENGTHS:  Patient Strengths:   Patient Strengths and Limitations:   Addictive Behavior: Addictive Behavior  In the Past 3 Months, Have You Felt or Has Someone Told You That You Have a Problem With  : None  Medical Problems:History reviewed. No pertinent past medical history.  Status EXAM:Mental Status and Behavioral Exam  Normal: No  Level of Assistance: Independent/Self  Facial Expression: Brightened, Sad  Affect: Appropriate  Level of Consciousness: Alert  Frequency of Checks: 4 times per hour, close  Mood:Normal: No  Mood: Depressed, Anxious, Sad  Motor Activity:Normal: Yes  Eye Contact: Fair  Observed Behavior: Cooperative, Friendly, Tearful, Preoccupied  Sexual Misconduct History: Current - no  Preception: [place] to person, [place] to time, [place] to place  Attention:Normal: No  Attention: Distractible  Thought Processes: Other (comment), Unremarkable (linear)  Thought Content:Normal: No  Thought Content: Preoccupations  Depression Symptoms: Impaired concentration, Feelings of helplessness, Feelings of hopelessess  Anxiety Symptoms: Generalized  Janna Symptoms: No problems reported or observed.  Hallucinations: None (patient denies hallucinations)  Delusions: No  Memory:Normal: Yes  Insight and Judgment: No  Insight and Judgment: Poor judgment, Poor insight    EDUCATION:   Learner Progress Toward Treatment Goals: Will review group plans and strategies for care.    Method: Group therapy, Medication compliance, Individualized assessments and Care

## 2024-08-16 NOTE — PLAN OF CARE
Problem: Self Harm/Suicidality  Goal: Will have no self-injury during hospital stay  Description: INTERVENTIONS:  1.  Ensure constant observer at bedside with Q15M safety checks  2.  Maintain a safe environment  3.  Secure patient belongings  4.  Ensure family/visitors adhere to safety recommendations  5.  Ensure safety tray has been added to patient's diet order  6.  Every shift and PRN: Re-assess suicidal risk via Frequent Screener    Note: Patient has had no attempts at self harm during her hospital stay     Problem: Depression  Goal: Will be euthymic at discharge  Description: INTERVENTIONS:  1. Administer medication as ordered  2. Provide emotional support via 1:1 interaction with staff  3. Encourage involvement in milieu/groups/activities  4. Monitor for social isolation  Note: Patient is reporting depression.  She states it is improving.  She was witnessed socializing with other peers

## 2024-08-16 NOTE — H&P
IN-PATIENT SERVICE  [facility] Internal Medicine  [facility] Internal Medicine   Seamus Bejarano MD; Sebastien Trotter MD; Ton Yan MD; Abdoulaye Johnson MD,   Silvia Lockhart MD; Ruddy Chowdhury MD; Misa Rome MD; Savita Blake MD; Bianca Rodas MD    HISTORY AND PHYSICAL EXAMINATION/ CONSULT NOTE            Date:   8/16/2024  Patient name:  Sharri Warner  Date of admission:  8/15/2024  3:28 PM  MRN:   [id number]  Account:  [id number]  YOB: 2005  PCP:    No primary care provider on file.  Room:   [address] [place] [address]  Code Status:    Full Code    Physician Requesting Consult: Isaac Lew MD    Reason for Consult: History and physical, medical management    Chief Complaint:     No chief complaint on file.    Medical management    History Obtained From:     Patient, EMR, nursing staff    History of Present Illness:     18-year-old female history of anxiety depression presented to ER with overdose of Tylenol Abilify BuSpar, initially admitted to acute medical, treated with N-acetylcysteine, Tylenol and other chemistry monitored per poison control recommendations seen by psych and transferred to [facility]    No significant medical history  Patient denies any chest pain palpitation cough difficulty breathing nausea vomiting diarrhea or urinary symptoms      Past Medical History:     History reviewed. No pertinent past medical history.     Past Surgical History:     No past surgical history on file.     Medications Prior to Admission:     Prior to Admission medications    Medication Sig Start Date End Date Taking? Authorizing Provider   busPIRone (BUSPAR) 5 MG tablet Take 1 tablet by mouth 2 times daily 12/5/23 12/4/24 Yes ProviderMary Jane MD   ARIPiprazole (ABILIFY) 5 MG tablet Take 1 tablet by mouth every morning 2/4/24  Yes ProviderMary Jane MD   tobramycin-dexAMETHasone (TOBRADEX) 0.3-0.1 % ophthalmic suspension Place 1 drop into both eyes every 4 hours (while awake) For 
mg daily followed by Abilify Aristada has been ordered for the patient.  The patient can be discharged on Sunday if they are doing well     PLAN  Medications as noted above  Attempt to develop insight  Psycho-education conducted.  Supportive Therapy conducted.  Follow-up daily while on inpatient unit    Electronically signed by MASOUD FORTUNE MD on 8/16/24 at 6:14 PM EDT

## 2024-08-17 LAB
ANION GAP SERPL CALCULATED.3IONS-SCNC: 11 MMOL/L (ref 9–17)
BUN SERPL-MCNC: 14 MG/DL (ref 6–20)
CALCIUM SERPL-MCNC: 9.3 MG/DL (ref 8.6–10.4)
CHLORIDE SERPL-SCNC: 104 MMOL/L (ref 98–107)
CO2 SERPL-SCNC: 23 MMOL/L (ref 20–31)
CREAT SERPL-MCNC: 0.7 MG/DL (ref 0.5–0.9)
GFR, ESTIMATED: >90 ML/MIN/1.73M2
GLUCOSE SERPL-MCNC: 94 MG/DL (ref 70–99)
POTASSIUM SERPL-SCNC: 3.8 MMOL/L (ref 3.7–5.3)
SODIUM SERPL-SCNC: 138 MMOL/L (ref 135–144)

## 2024-08-17 PROCEDURE — 80048 BASIC METABOLIC PNL TOTAL CA: CPT

## 2024-08-17 PROCEDURE — 1240000000 HC EMOTIONAL WELLNESS R&B

## 2024-08-17 PROCEDURE — 36415 COLL VENOUS BLD VENIPUNCTURE: CPT

## 2024-08-17 PROCEDURE — 99232 SBSQ HOSP IP/OBS MODERATE 35: CPT | Performed by: NURSE PRACTITIONER

## 2024-08-17 PROCEDURE — 6370000000 HC RX 637 (ALT 250 FOR IP): Performed by: PSYCHIATRY & NEUROLOGY

## 2024-08-17 RX ADMIN — TRAZODONE HYDROCHLORIDE 50 MG: 50 TABLET ORAL at 20:36

## 2024-08-17 RX ADMIN — TOBRAMYCIN AND DEXAMETHASONE 1 DROP: 1; 3 SUSPENSION/ DROPS OPHTHALMIC at 15:19

## 2024-08-17 RX ADMIN — HYDROXYZINE HYDROCHLORIDE 50 MG: 50 TABLET, FILM COATED ORAL at 20:36

## 2024-08-17 RX ADMIN — TOBRAMYCIN AND DEXAMETHASONE 1 DROP: 1; 3 SUSPENSION/ DROPS OPHTHALMIC at 06:54

## 2024-08-17 RX ADMIN — ARIPIPRAZOLE 15 MG: 15 TABLET ORAL at 07:50

## 2024-08-17 RX ADMIN — TOBRAMYCIN AND DEXAMETHASONE 1 DROP: 1; 3 SUSPENSION/ DROPS OPHTHALMIC at 19:13

## 2024-08-17 RX ADMIN — TOBRAMYCIN AND DEXAMETHASONE 1 DROP: 1; 3 SUSPENSION/ DROPS OPHTHALMIC at 11:32

## 2024-08-17 RX ADMIN — TOBRAMYCIN AND DEXAMETHASONE 1 DROP: 1; 3 SUSPENSION/ DROPS OPHTHALMIC at 20:36

## 2024-08-17 ASSESSMENT — LIFESTYLE VARIABLES
HOW MANY STANDARD DRINKS CONTAINING ALCOHOL DO YOU HAVE ON A TYPICAL DAY: 1 OR 2
HOW OFTEN DO YOU HAVE A DRINK CONTAINING ALCOHOL: MONTHLY OR LESS

## 2024-08-17 NOTE — GROUP NOTE
Group Therapy Note    Date: 8/17/2024    Group Start Time: 1430  Group End Time: 1530  Group Topic: Psychoeducation    [facility]    Group Therapy Note    Attendees: 6/13     Patient's Goal:  Patient will demonstrate improved interpersonal skills    Notes:  Patient attended group and participated    Status After Intervention:  Improved    Participation Level: Active Listener and Interactive    Participation Quality: Appropriate, Attentive, Sharing, and Supportive      Speech:  normal      Thought Process/Content: Logical  Linear      Affective Functioning: Congruent      Mood: euthymic      Level of consciousness:  Alert, Oriented x4, and Attentive      Response to Learning: Able to verbalize current knowledge/experience, Able to verbalize/acknowledge new learning, Able to retain information, Capable of insight, Able to change behavior, and Progressing to goal      Endings: None Reported    Modes of Intervention: Education, Support, Socialization, and Exploration      Discipline Responsible: Psychoeducational Specialist      Signature:  JESSICA Roca

## 2024-08-17 NOTE — GROUP NOTE
Group Therapy Note    Date: 8/17/2024    Group Start Time: 0900  Group End Time: 0925  Group Topic: Orientation Group    Mimi Garza LPN        Group Therapy Note    Attendees: 6/15       Patient's Goal:   Goal setting.    Notes:   Patient verbalized the understanding of goals.    Status After Intervention:  Improved    Participation Level: Active Listener and Interactive    Participation Quality: Appropriate, Attentive, Sharing, and Supportive      Speech:  normal      Thought Process/Content: Logical      Affective Functioning: Congruent      Mood: anxious      Level of consciousness:  Alert and Attentive      Response to Learning: Able to verbalize current knowledge/experience, Able to verbalize/acknowledge new learning, and Able to retain information      Endings: None Reported    Modes of Intervention: Education, Support, and Socialization      Discipline Responsible: Licensed Practical Nurse      Signature:  Mimi Valdes LPN

## 2024-08-17 NOTE — PLAN OF CARE
Problem: Self Harm/Suicidality  Goal: Will have no self-injury during hospital stay  Description: INTERVENTIONS:  1.  Ensure constant observer at bedside with Q15M safety checks  2.  Maintain a safe environment  3.  Secure patient belongings  4.  Ensure family/visitors adhere to safety recommendations  5.  Ensure safety tray has been added to patient's diet order  6.  Every shift and PRN: Re-assess suicidal risk via Frequent Screener    Outcome: Progressing     Problem: Depression  Goal: Will be euthymic at discharge  Description: INTERVENTIONS:  1. Administer medication as ordered  2. Provide emotional support via 1:1 interaction with staff  3. Encourage involvement in milieu/groups/activities  4. Monitor for social isolation  Outcome: Progressing     Problem: Anxiety  Goal: Will report anxiety at manageable levels  Description: INTERVENTIONS:  1. Administer medication as ordered  2. Teach and rehearse alternative coping skills  3. Provide emotional support with 1:1 interaction with staff  Outcome: Progressing     Patient remains safe on unit, free from injury and self-harm.  Patient denies suicidal ideations or thoughts of self-harm at this time.  15 minute safety checks in place and will continue.  Patient endorses improvement in mood and thoughts, but continues to endorse anxiety. Patient is brightened with peers, and friendly with staff.  Patient remains in behavioral control and cooperative with all care.  Patient reports adequate food and fluid intake.

## 2024-08-17 NOTE — PROGRESS NOTES
Leisure assessment unable to be completed. Will attempt again next day  
RT ASSESSMENT TREATMENT GOALS    [x]Pt Goal:  Pt will identify 1-2 positive coping skills by time of discharge.    [x]Pt Goal:  Pt will identify 1-2 positive aspects of self by time of discharge.    []Pt Goal:  Pt will remain on task/topic for 15-30 minutes during group by time of discharge.    []Pt Goal:  Pt will identify 1-2 aspects of relapse prevention plan by time of discharge.    []Pt Goal:  Pt will join in conversation with peers 1-2 times per group by time of discharge.    []Pt Goal:  Pt will identify 1-2 new leisure interests by time of discharge.    []Pt Goal: Pt will maintain behavorial control until the time of discharge.     
treatment furnished directly by or requiring the supervision of inpatient psychiatric personnel.    Electronically signed by CARLA Mcginnis CNP on 8/17/2024 at 4:00 PM    **This report has been created using voice recognition software. It may contain minor errors which are inherent in voice recognition technology.**

## 2024-08-17 NOTE — GROUP NOTE
Group Therapy Note    Date: 8/17/2024    Group Start Time: 1030  Group End Time: 1130  Group Topic: Psychoeducation    Crystal Garcia, LSW        Group Therapy Note    Attendees: 6/13       patient refused to attend psychoeducation group at 1030a after encouragement from staff.  1:1 talk time provided as alternative to group session

## 2024-08-18 VITALS
DIASTOLIC BLOOD PRESSURE: 95 MMHG | TEMPERATURE: 97.5 F | HEIGHT: 67 IN | WEIGHT: 253 LBS | OXYGEN SATURATION: 100 % | BODY MASS INDEX: 39.71 KG/M2 | SYSTOLIC BLOOD PRESSURE: 134 MMHG | HEART RATE: 84 BPM | RESPIRATION RATE: 16 BRPM

## 2024-08-18 PROCEDURE — 99239 HOSP IP/OBS DSCHRG MGMT >30: CPT | Performed by: PSYCHIATRY & NEUROLOGY

## 2024-08-18 RX ORDER — HYDROXYZINE 50 MG/1
50 TABLET, FILM COATED ORAL 3 TIMES DAILY PRN
Qty: 90 TABLET | Refills: 0 | Status: SHIPPED | OUTPATIENT
Start: 2024-08-18 | End: 2024-09-17

## 2024-08-18 RX ADMIN — TOBRAMYCIN AND DEXAMETHASONE 1 DROP: 1; 3 SUSPENSION/ DROPS OPHTHALMIC at 05:45

## 2024-08-18 RX ADMIN — TOBRAMYCIN AND DEXAMETHASONE 1 DROP: 1; 3 SUSPENSION/ DROPS OPHTHALMIC at 10:15

## 2024-08-18 NOTE — GROUP NOTE
Group Therapy Note    Date: 8/18/2024    Group Start Time: 0900  Group End Time: 1000  Group Topic: Group Documentation    Bubba Godoy        Group Therapy Note    Attendees: 6/16         Patient's Goal:  learn goal-setting strategies    Notes:  Morning goal group session, patient has opportunity to reflect on what they need to accomplish to achieve discharge, and decide on a step towards that that they would like to accomplish by the end of the day today.     Status After Intervention:  Improved    Participation Level: Active Listener and Interactive    Participation Quality: Appropriate, Attentive, Sharing, and Supportive      Speech:  normal      Thought Process/Content: Logical  Linear      Affective Functioning: Congruent      Mood: euthymic      Level of consciousness:  Alert, Oriented x4, and Attentive      Response to Learning: Capable of insight, Able to change behavior, and Progressing to goal      Endings: None Reported    Modes of Intervention: Education, Support, and Socialization      Discipline Responsible: Behavorial Health Tech      Signature:  Bubba Cueva

## 2024-08-18 NOTE — BH NOTE
On call provider notified of best practice advisory placing patient on suicide precautions. Order will be discontinued as patient does not meet criteria for suicide precautions at this time.  Patient will be continued on Q 15 minute checks.   
Patient arrived from ICU (room 2002) via wheelchair with belongings.  
Patient given quit line phone number [phone] at this time, refusing to call at this time, states \" I just don't want to quit now\"-  dangers of longterm tobacco use discussed.    
Patient given tobacco quitline number 45693250621 at this time, refusing to call at this time, states \" I just dont want to quit now\"- patient given information as to the dangers of long term tobacco use. Continue to reinforce the importance of tobacco cessation.     
Patient triggered suicide precautions due to previous charting in ICU Patient at this time denies suicidal ideations and feels safe to themself. Patient contracts for safety while on the unit. Patient does not feel the need to harm themself. Suicide precautions and constant observation discontinued.     
The patient presented to the counter complaining of abdominal pain from menses. She requested pain medication and an ice pack. PRN Ibuprofen 600 mg administered as prescribed. Ice pack provided as well. Staff will assess the patient in an hour for efficacy of the medication/relief from the ice pack.   
distraction)                                                           ( ) Basic information about quitting (benefits of quitting, techniques in how to quit, available resources  ( ) [facility]                                                                                                                   (x) Patient refused counseling  (x) Patient refused referral  ( ) Patient refused prescription upon discharge  ( ) Patient has not smoked in the last 30 days    Metabolic Screening:    No results found for: \"LABA1C\"    No results found for: \"CHOL\"  No results found for: \"TRIG\"  No results found for: \"HDL\"  No components found for: \"LDLCAL\"  No components found for: \"LABVLDL\"    Kya Chance LPN      
information about quitting (benefits of quitting, techniques in how to quit, available resources  ( ) Ref[facility] for [facility]                                                                                                                   (x ) Patient refused counseling  ( ) Patient has not smoked in the last 30 days    Metabolic Screening:    No results found for: \"LABA1C\"    No results found for: \"CHOL\"  No results found for: \"TRIG\"  No results found for: \"HDL\"  No components found for: \"LDLCAL\"  No components found for: \"LABVLDL\"      Body mass index is 39.63 kg/m².    BP Readings from Last 2 Encounters:   08/15/24 124/68   08/15/24 130/81       Recliner Assessment:  Patient is able to demonstrate the ability to move from a reclining position to an upright position within the recliner.    Pt admitted with followings belongings:  Dental Appliances: None  Vision - Corrective Lenses: Eyeglasses  Hearing Aid: None  Jewelry: Body Piercing  Body Piercings Removed: No (perminant piercings in face and breasts)  Clothing: Undergarments, Socks  Other Valuables: Other (Comment) (none)  The following personal items were collected during admission. Items secured in locker/safe. Items will be returned to patient at discharge.        Patient arrived on unit transported by two staff members via wheelchair   with signed voluntary in hand. Patient ambulated independently was assessed, vitals obtained, wanded, changed into appropriate clothing and orientated to unit.           Emanuel Ontiveros RN

## 2024-08-18 NOTE — DISCHARGE SUMMARY
(MYCOSTATIN) POWD powder Comments:   Reason for Stopping:                Core Measures statement:   Not applicable    Discharge Exam:  Level of consciousness:  Within normal limits  Appearance: Street clothes, seated, with good grooming  Behavior/Motor: No abnormalities noted  Attitude toward examiner:  Cooperative, attentive, good eye contact  Speech:  spontaneous, normal rate, normal volume and well articulated  Mood:  euthymic  Affect:  Full range  Thought processes:  linear, goal directed and coherent  Thought content:  denies homicidal ideation  Suicidal Ideation:  denies suicidal ideation  Delusions:  no evidence of delusions  Perceptual Disturbance:  denies any perceptual disturbance  Cognition:  Intact  Memory: age appropriate  Insight & Judgement: fair  Medication side effects: denies     Disposition: home    Patient Instructions:   Activity: activity as tolerated  1. Patient instructed to take medications regularly and follow up with outpatient appointments.     Follow-up as scheduled with [facility]       Signed:    Electronically signed by Yandy Walden MD on 8/18/24 at 12:52 PM EDT    Time Spent on discharge is more than 33 minutes in the examination, evaluation, counseling and review of medications and discharge plan.

## 2024-08-18 NOTE — PLAN OF CARE
Problem: Depression  Goal: Will be euthymic at discharge  Description: INTERVENTIONS:  1. Administer medication as ordered  2. Provide emotional support via 1:1 interaction with staff  3. Encourage involvement in milieu/groups/activities  4. Monitor for social isolation  8/17/2024 2206 by Abhilash Leigh, RN  Outcome: Progressing     Problem: Anxiety  Goal: Will report anxiety at manageable levels  Description: INTERVENTIONS:  1. Administer medication as ordered  2. Teach and rehearse alternative coping skills  3. Provide emotional support with 1:1 interaction with staff  8/17/2024 2206 by Abhilash Leigh, RN  Outcome: Progressing    Pt denies thoughts of self-harm, safety checks maintained Q15 minutes. Pt denies any depression or anxiety. Verbalizes readiness for discharge. Pt is pleasant, cooperative, brightened, and present in milieu. Compliant with medications. No complaints about sleep or appetite. Pt tend to hygiene appropriately.

## 2024-08-18 NOTE — DISCHARGE INSTRUCTIONS
program [phone]     Suicide Hotline ([facility] Crisis Care Line)  (759) [phone]      Recovery Help line- [phone]      To obtain results of pending studies call Medical Records at: [phone]     For emergencies and 24 hour/7 days a week contact information:  [phone]

## 2024-08-18 NOTE — DISCHARGE INSTR - DIET

## 2024-08-18 NOTE — TRANSITION OF CARE
Fasting Blood Glucose or Hemoglobin A1c  No results found for: \"GLU\", \"GLUCPOC\"    No results found for: \"LABA1C\", \"[id number]\"    Discharge Diagnosis: Depression with suicidal ideation    Discharge Plan/Destination: Home - out patient follow-up    Discharge Medication List and Instructions:      Medication List        START taking these medications      hydrOXYzine HCl 50 MG tablet  Commonly known as: ATARAX  Take 1 tablet by mouth 3 times daily as needed for Anxiety            CONTINUE taking these medications      tobramycin-dexAMETHasone 0.3-0.1 % ophthalmic suspension  Commonly known as: TOBRADEX            STOP taking these medications      ARIPiprazole 5 MG tablet  Commonly known as: ABILIFY     busPIRone 5 MG tablet  Commonly known as: BUSPAR     nystatin Powd powder  Commonly known as: MYCOSTATIN               Where to Get Your Medications        These medications were [id number] [place] [facility] [address] - P [phone] - F [phone]  [address], [place] [address]      Phone: [phone]   hydrOXYzine HCl 50 MG tablet         Unresulted Labs (24h ago, onward)      None            To obtain results of studies pending at discharge, please contact Medical Records at [phone]    Follow-up Information       Follow up With Specialties Details Why Contact Info    [facility] [place]  Follow up on 8/26/2024 You have a scheduled appointment for a hospital Follow up with RAUL Samson on Monday August 26th at 9:00am [address]  [place] [address]  [phone]  fax [phone]             Advanced Directive:   Does the patient have an appointed surrogate decision maker? No  Does the patient have a Medical Advance Directive? No  Does the patient have a Psychiatric Advance Directive? No  If the patient does not have a surrogate or Medical Advance Directive AND Psychiatric Advance Directive, the patient was offered information on these advance directives

## 2024-08-19 NOTE — DISCHARGE SUMMARY
IN-PATIENT SERVICE   [facility] Internal Medicine    Discharge Summary     Patient ID: Sharri Warner  :  2005   MRN: [id number]     ACCOUNT:  [id number]   Patient's PCP: No primary care provider on file.  Admit Date: 2024   Discharge Date: 2024    Length of Stay: 1  Code Status:  Prior  Admitting Physician: Yandy Walden MD  Discharge Physician: Misa Rome MD     Active Discharge Diagnoses:     Primary Problem  Tylenol overdose, intentional self-harm, initial encounter ([facility])      Hospital Problems  Active Hospital Problems    Diagnosis Date Noted    Depression with suicidal ideation [F32.A, R45.851] 08/15/2024    Tylenol overdose, intentional self-harm, initial encounter ([facility]) [T39.1X2A] 2024       Admission Condition:  fair     Discharged Condition: fair    Hospital Stay:     Hospital Course:  Sharri Warner is a 18 y.o. adult who was admitted for the management of Tylenol overdose, intentional self-harm, initial encounter ([facility]) , presented to ER with Ingestion and Suicide Attempt  Patient is 18-year-old female with past medical history of anxiety and depression presented to the ER with drug overdose, patient overdosed on Tylenol Joe Arteaga, patient not known she took, initially concern was the patient took 50+ tablets of Tylenol 500 mg, her initial Tylenol level was 53 which was not not the peak since before hours when not passed, repeat Tylenol level was 106  Patient had nausea last night which is now resolved, LFTs been  The repeat acetaminophen level undetectable, patient discharged to [facility] in stable condition      Significant therapeutic interventions:     Significant Diagnostic Studies:   Labs / Micro:        Radiology:    No results found.      Consultations:    Consults:     Final Specialist Recommendations/Findings:   IP CONSULT TO CRITICAL CARE  IP CONSULT TO INTERNAL MEDICINE  IP CONSULT TO PSYCHIATRY      The patient was seen and examined on day of

## 2024-08-19 NOTE — CARE COORDINATION
Name: Sharri Warner    : 2005    Auth number: [id number]     Discharge Date: 24    Destination: home     Discharge Medications:      Medication List        START taking these medications      hydrOXYzine HCl 50 MG tablet  Commonly known as: ATARAX  Take 1 tablet by mouth 3 times daily as needed for Anxiety  Notes to patient: Anxiety            CONTINUE taking these medications      tobramycin-dexAMETHasone 0.3-0.1 % ophthalmic suspension  Commonly known as: TOBRADEX  Notes to patient: Eyes            STOP taking these medications      ARIPiprazole 5 MG tablet  Commonly known as: ABILIFY     busPIRone 5 MG tablet  Commonly known as: BUSPAR     nystatin Powd powder  Commonly known as: MYCOSTATIN               Where to Get Your Medications        These medications were sent to [facility] [address] [place] [address] [address] [place] [address] - P [phone] - F [phone]  [address] [facility] [address]      Phone: [phone]   hydrOXYzine HCl 50 MG tablet         [facility]  [address]  [place] [address]  [phone]  fax [phone]  Follow up on 2024  You have a scheduled appointment for a hospital Follow up with RAUL Samson on  at 9:00am